# Patient Record
Sex: FEMALE | Race: WHITE
[De-identification: names, ages, dates, MRNs, and addresses within clinical notes are randomized per-mention and may not be internally consistent; named-entity substitution may affect disease eponyms.]

---

## 2018-08-09 ENCOUNTER — HOSPITAL ENCOUNTER (EMERGENCY)
Dept: HOSPITAL 25 - ED | Age: 38
Discharge: HOME | End: 2018-08-09
Payer: SELF-PAY

## 2018-08-09 VITALS — DIASTOLIC BLOOD PRESSURE: 60 MMHG | SYSTOLIC BLOOD PRESSURE: 101 MMHG

## 2018-08-09 DIAGNOSIS — Z98.84: ICD-10-CM

## 2018-08-09 DIAGNOSIS — R00.1: ICD-10-CM

## 2018-08-09 DIAGNOSIS — R11.2: ICD-10-CM

## 2018-08-09 DIAGNOSIS — R10.31: Primary | ICD-10-CM

## 2018-08-09 DIAGNOSIS — Z80.0: ICD-10-CM

## 2018-08-09 LAB
BASOPHILS # BLD AUTO: 0 10^3/UL (ref 0–0.2)
EOSINOPHIL # BLD AUTO: 0.2 10^3/UL (ref 0–0.6)
HCT VFR BLD AUTO: 27 % (ref 35–47)
HGB BLD-MCNC: 8.3 G/DL (ref 12–16)
LYMPHOCYTES # BLD AUTO: 1.9 10^3/UL (ref 1–4.8)
MCH RBC QN AUTO: 19 PG (ref 27–31)
MCHC RBC AUTO-ENTMCNC: 31 G/DL (ref 31–36)
MCV RBC AUTO: 61 FL (ref 80–97)
MONOCYTES # BLD AUTO: 0.5 10^3/UL (ref 0–0.8)
NEUTROPHILS # BLD AUTO: 2.8 10^3/UL (ref 1.5–7.7)
NRBC # BLD AUTO: 0 10^3/UL
NRBC BLD QL AUTO: 0.1
PLATELET # BLD AUTO: 408 10^3/UL (ref 150–450)
RBC # BLD AUTO: 4.37 10^6/UL (ref 4–5.4)
RBC UR QL AUTO: (no result)
WBC # BLD AUTO: 5.5 10^3/UL (ref 3.5–10.8)
WBC UR QL AUTO: (no result)

## 2018-08-09 PROCEDURE — 83690 ASSAY OF LIPASE: CPT

## 2018-08-09 PROCEDURE — 86140 C-REACTIVE PROTEIN: CPT

## 2018-08-09 PROCEDURE — 80053 COMPREHEN METABOLIC PANEL: CPT

## 2018-08-09 PROCEDURE — 93005 ELECTROCARDIOGRAM TRACING: CPT

## 2018-08-09 PROCEDURE — 84484 ASSAY OF TROPONIN QUANT: CPT

## 2018-08-09 PROCEDURE — 81015 MICROSCOPIC EXAM OF URINE: CPT

## 2018-08-09 PROCEDURE — 82550 ASSAY OF CK (CPK): CPT

## 2018-08-09 PROCEDURE — 85060 BLOOD SMEAR INTERPRETATION: CPT

## 2018-08-09 PROCEDURE — 99284 EMERGENCY DEPT VISIT MOD MDM: CPT

## 2018-08-09 PROCEDURE — 81003 URINALYSIS AUTO W/O SCOPE: CPT

## 2018-08-09 PROCEDURE — 96376 TX/PRO/DX INJ SAME DRUG ADON: CPT

## 2018-08-09 PROCEDURE — 85025 COMPLETE CBC W/AUTO DIFF WBC: CPT

## 2018-08-09 PROCEDURE — 87086 URINE CULTURE/COLONY COUNT: CPT

## 2018-08-09 PROCEDURE — 74177 CT ABD & PELVIS W/CONTRAST: CPT

## 2018-08-09 PROCEDURE — 83735 ASSAY OF MAGNESIUM: CPT

## 2018-08-09 PROCEDURE — 96374 THER/PROPH/DIAG INJ IV PUSH: CPT

## 2018-08-09 PROCEDURE — 36415 COLL VENOUS BLD VENIPUNCTURE: CPT

## 2018-08-09 PROCEDURE — 96375 TX/PRO/DX INJ NEW DRUG ADDON: CPT

## 2018-08-09 PROCEDURE — 83605 ASSAY OF LACTIC ACID: CPT

## 2018-08-09 NOTE — ED
Abdominal Pain/Female





- HPI Summary


HPI Summary: 


This is scribe Demarcus Bond documenting for attending Dr. Luis AVILEZ


This patient is a 38 year old F presenting to [facility] with a chief complaint of 8/

10 abd pain since 8/3/18 (but worse since last 2 days). She endorses N/V, 

recent travel and stay in [place], chills, and LKMP starting 8/2/18. Pt denies 

fever, PMHx, Rx, smoking, and substance use. SHx gastric band surgery in 2010.


I, Dr. Smith personally performed the services described in this documentation 

as scribed in my presence and it is both accurate and complete.





- History of Current Complaint


Chief Complaint: EDNauseaVomitDiarrh


Stated Complaint: N/V


Time Seen by Provider: 08/09/18 08:30


Hx Obtained From: Patient


Hx Last Menstrual Period: 8/2/18


Onset/Duration: Gradual Onset, Lasting Weeks, Still Present, Worse Since - 2 

days ago (8/7/18)


Timing: Constant


Severity Initially: Mild


Severity Currently: Severe


Pain Intensity: 8


Pain Scale Used: 0-10 Numeric


Location: Discrete At: RLQ


Radiates: No


Character: Sharp


Aggravating Factor(s): Nothing


Alleviating Factor(s): Nothing


Associated Signs and Symptoms: Positive: Nausea, Vomiting, Other: - chills.  

Negative: Fever


Allergies/Adverse Reactions: 


 Allergies











Allergy/AdvReac Type Severity Reaction Status Date / Time


 


No Known Allergies Allergy   Verified 08/09/18 08:23














PMH/Surg Hx/FS Hx/Imm Hx


Endocrine/Hematology History: 


   Denies: Hx Sickle Cell Disease


Cardiovascular History: 


   Denies: Hx Myocardial Infarction


Respiratory History: 


   Denies: Hx Lung Cancer


 History: 


   Denies: Hx Dialysis


Musculoskeletal History: 


   Denies: Hx Rheumatoid Arthritis


Sensory History: 


   Denies: Hx Legally Blind, Hx Deafness


Opthamlomology History: 


   Denies: Hx Legally Blind


EENT History: 


   Denies: Hx Deafness


Neurological History: 


   Denies: Hx CVA


Psychiatric History: 


   Denies: Hx Schizophrenia





- Surgical History


Surgery Procedure, Year, and Place: Gastric band 2010


Infectious Disease History: No


Infectious Disease History: 


   Denies: Traveled Outside the [place] in Last 30 Days





- Family History


Known Family History: Positive: Other - gall bladder disease, cancer





- Social History


Occupation: Employed Full-time


Alcohol Use: Occasionally


Hx Substance Use: No


Substance Use Type: Reports: None


Smoking Status (MU): Never Smoked Tobacco





Review of Systems


Positive: Chills.  Negative: Fever


Positive: Abdominal Pain, Vomiting, Nausea


Positive: no symptoms reported


All Other Systems Reviewed And Are Negative: Yes





Physical Exam





- Summary


Physical Exam Summary: 


VITAL SIGNS: Reviewed.


GENERAL: Patient is an obese female who is lying comfortable in the stretcher. 

Patient is not in any acute respiratory distress.


HEAD AND FACE: No signs of trauma. No ecchymosis, hematomas or skull 

depressions. No sinus tenderness.


EYES: PERRLA, EOMI x 2, No injected conjunctiva, no nystagmus.


EARS: Hearing grossly intact. Ear canals and tympanic membranes are within 

normal limits.


MOUTH: Oropharynx within normal limits. Dry oral mucosa


NECK: Supple, trachea is midline, no adenopathy, no JVD, no carotid bruit, no c-

spine tenderness, neck with full ROM.


CHEST: Symmetric, no tenderness at palpation


LUNGS: Clear to auscultation bilaterally. No wheezing or crackles.


CVS: Regular rate and rhythm, S1 and S2 present, no murmurs or gallops 

appreciated.


ABDOMEN: Soft, RLQ tenderness. No signs of distention. No rebound no guarding, 

and no masses palpated. Bowel sounds are normal.


EXTREMITIES: FROM in all major joints, no edema, no cyanosis or clubbing.


NEURO: Alert and oriented x 3. No acute neurological deficits. Speech is normal 

and follows commands.


SKIN: Dry and warm, increased turgor


Triage Information Reviewed: Yes


Vital Signs On Initial Exam: 


 Initial Vitals











Temp Pulse Resp BP Pulse Ox


 


 97.7 F   84   18   132/85   100 


 


 08/09/18 08:17  08/09/18 08:17  08/09/18 08:17  08/09/18 08:17  08/09/18 08:17











Vital Signs Reviewed: Yes





Diagnostics





- Vital Signs


 Vital Signs











  Temp Pulse Resp BP Pulse Ox


 


 08/09/18 08:52    16  


 


 08/09/18 08:17  97.7 F  84  18  132/85  100














- Laboratory


Result Diagrams: 


 08/09/18 08:46





 08/09/18 08:46


Lab Statement: Any lab studies that have been ordered have been reviewed, and 

results considered in the medical decision making process.





- CT


  ** A/P


CT Interpretation: No Acute Changes


CT Interpretation Completed By: Radiologist - NO ACUTE CT FINDINGS. NO MASS OR 

INFLAMMATORY CHANGES.  GASTRIC LAP BANDING. Dr. Smith has reviewed this report.





- EKG


  ** 0847


Cardiac Rate: Bradycardia - 57


ST Segment: Normal


Ectopy: None


EKG Interpretation: No STEMI, nl axis





Abdominal Pain Fem Course/Dx





- Course


Course Of Treatment: Dr. Marrero released some pressure from lap band, thinks pt 

needs endoscopy, but since patient lives in [place], pt prefers to do the 

workup there.  This patient is a 38-year-old female who presents to the 

emergency department with a chief complaint of having abdominal pain along with 

nausea and vomiting.  Patient reports that she is unable to keep anything PO.  

And blood test results without any significant abnormality except for 

hemoglobin 8.3 hematocrit 27 and MCV 61 consistent with a hypochromic 

microcytic anemia.  The potassium level is 3.0 and magnesium 1.7.  Patient was 

given potassium chloride and magnesium by mouth.  At this time the patient is 

able to tolerate by mouth.  The pain has subsided.  The patient was given IV 

fluids, Zofran and Reglan for the nausea and vomiting.  She was given morphine 

for the pain.  Abdominopelvic CT impression: No acute intra-abdominal 

pathology.  Positive gastric  lap banding.  Patient reports the nausea hasnt 

significantly improved.  I discussed the case with Dr. Marrero from surgery and 

he came and examined the patient.  He reports that he decreased the tension on 

the gastric lap band and the patient is feeling better.  He recommends to get a 

endoscopy however the patient is from [place] and she wants to go back 

tomorrow.  Therefore she reports that she will follow-up with her surgeon for 

follow-up visit and possibly an endoscopy.  Patient will be given a 

prescription for Reglan and the follow-up with her surgeon.  Patient is 

hemodynamically stable alert and 3.





- Diagnoses


Provider Diagnoses: 


 Nausea & vomiting, Abdominal pain








- Provider Notifications


Discussed Care Of Patient With: Sameer Marrero


Time Discussed With Above Provider: 14:15


Instructed by Provider To: Other - Will see pt in ED. update 1515. Dr marrero 

released some pressure from lap band, thinks pt needs endoscopy, but since 

patient lives in [place], pt prefers to do the workup there.





Discharge





- Sign-Out/Discharge


Documenting (check all that apply): Patient Departure - discharge





- Discharge Plan


Condition: Stable


Disposition: HOME


Prescriptions: 


Metoclopramide TAB* [Reglan TAB*] 10 mg PO Q8H #15 tab


Patient Education Materials:  Acute Nausea and Vomiting (ED), Abdominal Pain (ED

)


Referrals: 


[facility] [Provider Group] - 3 Days


Additional Instructions: 


Return to the emergency department for any new or worsening symptoms.





Attestations


User Type: Provider - I, Dr. Smith personally performed the services described 

in this documentation as scribed in my presence and it is both accurate and 

complete.

## 2018-08-09 NOTE — RAD
INDICATION: Abdominal pain. History of bariatric surgery 2010



COMPARISON: None

 

TECHNIQUE: Axial source images were obtained from the hemidiaphragms to the symphysis

pubis following administration of oral and intravenous contrast.  107 mL Omnipaque 300 was

utilized. Coronal and sagittal reconstructed images were acquired.



Lung bases: The lung bases are clear.



Liver: The liver is normal in size. There are no masses. There is no ductal dilatation.



Gallbladder: There are no calcified gallstones. There is no evidence of wall thickening or

pericholecystic fluid.



Spleen: The spleen is normal in size. There are no masses.



Pancreas: There is no focal pancreatic mass or ductal dilatation.



Adrenal glands: There is no evidence of adrenal mass.



Kidneys: The kidneys are normal in size and position. There are prompt nephrograms and

there is prompt excretion bilaterally. There are no renal parenchymal masses. There is no

evidence of nephrolithiasis.



Adenopathy: There is no evidence of adenopathy by size criteria.



Fluid collections: There are no free or localized fluid collections.



Vessels:There are no significant atherosclerotic changes involving the aorta. There is no

focal aneurysm. The iliac vessels are normal in caliber. The IVC appears normal.



GI tract: There are no acute CT bowel findings. There is gastric lap banding There is no

obstruction. The stomach and small bowel appear normal. The lower GI tract is normal.  The

cecum, ileocecal valve, and terminal ileum appear normal. The appendix not visualized.

There is no inflammatory change in the periappendiceal region.



Pelvic organs: The uterus and left adnexa are normal. There are prominent vessels in the

right adnexal region. This is of indeterminate significance but is believed to represent

an incidental finding. The right adnexa is otherwise unremarkable.



Bladder: There are no bladder masses.



Abdominal and pelvic soft tissues: The extraperitoneal abdominal and pelvic soft tissues

appear normal..



Osseous structures: There are no acute osseous findings.



Other: None



IMPRESSION:  NO ACUTE CT FINDINGS. NO MASS OR INFLAMMATORY CHANGES.  GASTRIC LAP BANDING.

## 2018-08-09 NOTE — CONS
CC:  Dr. Green.*

 

SURGICAL CONSULTATION NOTE:

 

DATE OF CONSULT:  08/09/18

 

LOCATION:  This patient was seen in the [facility] Emergency 
Department on Thursday, 07/09/18.

 

ATTENDING PHYSICIAN:  Dr. Sameer Green.

 

CHIEF COMPLAINT:  Nausea and abdominal pain.

 

HISTORY OF PRESENT ILLNESS:  The patient is a 38-year-old female from [place], 
who is visiting family here in [place]; she came to the emergency room with 
persistent nausea, vomiting and abdominal pain.  She had a lap gastric band 9 
years ago in [place]; she states that 2 years ago when she was pregnant, she 
had nausea and the Lap-Band was emptied and has not been refilled since.  For 
the past 2 years, she has had intermittent nausea and vomiting.  She has not 
had followup because her bariatric surgeon passed away and it has been 
difficult for her to reestablish bariatric care.  She does have a primary care 
provider in [place].  Six months ago, she had severe abdominal pain and had a 
workup for gallbladder disease and she reports that the ultrasound and CAT scan 
were normal.  She was treated for what was thought to be C. diff.  She reports 
since she arrived in [place] 1 week ago, she has been persistently nauseated and 
then on Monday of this week, she started vomiting and yesterday could not even 
tolerate ice chips.  She reported her abdominal pain mostly in the 
periumbilical region as 8/10 and occurred with vomiting or dry heaving.  Her 
last bowel movement was on Friday, 08/03/18 and was described as diarrhea.  She 
denies any blood or mucus in the stool.  She states that in the past, she did 
have some blood in her vomitus and had a blood transfusion in 2010. She reports 
acid reflux and takes Tums with some relief and has been trying to manage her 
diet to control those symptoms.  She denies any fever or chills or dysuria.  
Her last menstrual period started, 08/02/18.  In the emergency room, her white 
blood cell count was 5.5.  She was noted to have microcytic anemia with a 
hemoglobin of 8.3, hematocrit of 27; her potassium was low at 3 and magnesium 
was low at 1.7.  Dr. Green and Dr. Garcia reviewed the CAT scan of the abdomen
, which did not reveal any acute findings or any obstruction or no inflammatory 
changes around the appendix; Dr. Garcia commented that she might have a small 
hiatal hernia, but he did not note any Lap-Band slippage.  Dr. Garcia 
recommended intravenous hydration in the emergency room and a trial of clear 
liquids and then outpatient followup with upper endoscopy.  The patient is in 
agreement with the plan.  Dr. Green also accessed the Lap-Band port and 
removed approximately 1 mL of fluid.

 

PAST MEDICAL HISTORY:  Generally healthy other than previously mentioned acid 
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 
without surgery.  She states that she has had anemia and hypokalemia for the 
past 9 years since the Lap-Band.

 

PAST SURGICAL HISTORY:  Lap gastric banding 9 years ago in [place].

 

MEDICATIONS:  Multivitamin and occasionally iron when she remembers.

 

ALLERGIES:  No known drug allergies.

 

FAMILY HISTORY:  Parents are alive and well.  Her father is diabetic.  No known 
gastrointestinal conditions.

 

SOCIAL HISTORY:  She is here visiting her boyfriend who works in [place]; she 
has 3 children ages 13, 4 and 2; she has never been a smoker.  She rarely 
drinks alcohol and denies the use of substances and is employed as a .

 

REVIEW OF SYSTEMS:  Constitutional:  No fevers or chills.  She does feel tired 
most of the time.  She has had good weight loss from the gastric banding.  She 
was 330 pounds before the band and currently weighs 170 pounds and her weight 
has been essentially stable over the past year.  Endocrine:  No diabetes or 
thyroid disease. Cardiovascular:  No chest pain or palpitations.  Respiratory:  
No dyspnea on exertion.  No chronic cough.  Gastrointestinal:  As described in 
history of present illness.  Genitourinary:  No dysuria.  No history of kidney 
stones. Musculoskeletal:  No complaints.  Neurologic:  No history of seizures 
or concussions.  General:  No previous anesthesia complications.  No history of 
deep vein thrombosis or pulmonary embolism.

 

PHYSICAL EXAM:  General Survey:  The patient is a 38-year-old female, well- 
developed, well-nourished, in no acute distress.  Height 67 inches, weight 177 
pounds, body mass index 27.7.  Blood pressure 132/85, pulse 84 and regular, 
respiratory rate 18, temperature 97.7 tympanic, O2 saturation on room air 100%. 
Skin:  Warm, dry, intact.  HEENT:  Benign.  Neck:  Supple.  No cervical 
lymphadenopathy.  Lungs:  Breath sounds bilaterally clear and equal.  Heart: 
Regular rate and rhythm.  No murmurs or rubs appreciated.  Abdomen:  Hypoactive 
bowel sounds.  Soft, nondistended.  No guarding.  No obvious masses.  She 
reports "soreness," but is nontender to deep palpation.  There is a palpable Lap
-Band port in the epigastric region.  Pelvic and rectal exams deferred.  
Extremities are warm without edema or skin ulceration.  Neurologic:  Alert and 
oriented x3.  Steady gait.

 

IMPRESSION:  Dehydration due to vomiting.

 

PLAN:  As discussed with both Dr. Green and Dr. Garcia, the patient will be 
rehydrated with IV fluids in the emergency department.  She will have a trial 
of clear liquids and if she tolerates the clear liquids, she will be discharged 
home and she will have a followup in [place] and she plans to go home tomorrow.
  We recommended followup with an upper endoscopy to rule out any erosive 
problems such as an ulcer and she will also obtain old records from her 
previous bariatric surgeon.

 

TIME SPENT:  60 minutes with greater than 50% in face-to-face history taking 
and patient counseling.

 

____________________________________ BILL CALHOUN NP

 

[id number]/CPS #: [id number]

DAYSI

## 2018-08-10 ENCOUNTER — HOSPITAL ENCOUNTER (INPATIENT)
Dept: HOSPITAL 25 - ED | Age: 38
LOS: 4 days | Discharge: HOME | DRG: 989 | End: 2018-08-14
Attending: SURGERY | Admitting: HOSPITALIST
Payer: SELF-PAY

## 2018-08-10 DIAGNOSIS — T85.528A: Primary | ICD-10-CM

## 2018-08-10 DIAGNOSIS — K29.70: ICD-10-CM

## 2018-08-10 DIAGNOSIS — K21.9: ICD-10-CM

## 2018-08-10 DIAGNOSIS — Z83.3: ICD-10-CM

## 2018-08-10 DIAGNOSIS — K20.8: ICD-10-CM

## 2018-08-10 DIAGNOSIS — E86.0: ICD-10-CM
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
## 2018-08-10 DIAGNOSIS — R13.10: ICD-10-CM

## 2018-08-10 DIAGNOSIS — E87.6: ICD-10-CM

## 2018-08-10 DIAGNOSIS — E83.42: ICD-10-CM

## 2018-08-10 DIAGNOSIS — E86.1: ICD-10-CM

## 2018-08-10 DIAGNOSIS — K44.9: ICD-10-CM

## 2018-08-10 DIAGNOSIS — Z98.84: ICD-10-CM

## 2018-08-10 DIAGNOSIS — R11.2: ICD-10-CM

## 2018-08-10 DIAGNOSIS — D50.9: ICD-10-CM

## 2018-08-10 LAB
BASOPHILS # BLD AUTO: 0 10^3/UL (ref 0–0.2)
EOSINOPHIL # BLD AUTO: 0.2 10^3/UL (ref 0–0.6)
HCT VFR BLD AUTO: 24 % (ref 35–47)
HGB BLD-MCNC: 7.4 G/DL (ref 12–16)
LYMPHOCYTES # BLD AUTO: 1.4 10^3/UL (ref 1–4.8)
MCH RBC QN AUTO: 19 PG (ref 27–31)
MCHC RBC AUTO-ENTMCNC: 31 G/DL (ref 31–36)
MCV RBC AUTO: 62 FL (ref 80–97)
MONOCYTES # BLD AUTO: 0.4 10^3/UL (ref 0–0.8)
NEUTROPHILS # BLD AUTO: 2.1 10^3/UL (ref 1.5–7.7)
NRBC # BLD AUTO: 0 10^3/UL
NRBC BLD QL AUTO: 0
PLATELET # BLD AUTO: 341 10^3/UL (ref 150–450)
RBC # BLD AUTO: 3.92 10^6/UL (ref 4–5.4)
WBC # BLD AUTO: 4.1 10^3/UL (ref 3.5–10.8)

## 2018-08-10 PROCEDURE — 80048 BASIC METABOLIC PNL TOTAL CA: CPT

## 2018-08-10 PROCEDURE — 86900 BLOOD TYPING SEROLOGIC ABO: CPT

## 2018-08-10 PROCEDURE — 84100 ASSAY OF PHOSPHORUS: CPT

## 2018-08-10 PROCEDURE — 86850 RBC ANTIBODY SCREEN: CPT

## 2018-08-10 PROCEDURE — 83540 ASSAY OF IRON: CPT

## 2018-08-10 PROCEDURE — 36415 COLL VENOUS BLD VENIPUNCTURE: CPT

## 2018-08-10 PROCEDURE — 0DB58ZX EXCISION OF ESOPHAGUS, VIA NATURAL OR ARTIFICIAL OPENING ENDOSCOPIC, DIAGNOSTIC: ICD-10-PCS | Performed by: INTERNAL MEDICINE

## 2018-08-10 PROCEDURE — 83550 IRON BINDING TEST: CPT

## 2018-08-10 PROCEDURE — 82728 ASSAY OF FERRITIN: CPT

## 2018-08-10 PROCEDURE — 86901 BLOOD TYPING SEROLOGIC RH(D): CPT

## 2018-08-10 PROCEDURE — 83690 ASSAY OF LIPASE: CPT

## 2018-08-10 PROCEDURE — 88300 SURGICAL PATH GROSS: CPT

## 2018-08-10 PROCEDURE — 86922 COMPATIBILITY TEST ANTIGLOB: CPT

## 2018-08-10 PROCEDURE — 83735 ASSAY OF MAGNESIUM: CPT

## 2018-08-10 PROCEDURE — 88312 SPECIAL STAINS GROUP 1: CPT

## 2018-08-10 PROCEDURE — 85045 AUTOMATED RETICULOCYTE COUNT: CPT

## 2018-08-10 PROCEDURE — 99156 MOD SED OTH PHYS/QHP 5/>YRS: CPT

## 2018-08-10 PROCEDURE — 87077 CULTURE AEROBIC IDENTIFY: CPT

## 2018-08-10 PROCEDURE — 85018 HEMOGLOBIN: CPT

## 2018-08-10 PROCEDURE — 85014 HEMATOCRIT: CPT

## 2018-08-10 PROCEDURE — 0DB68ZX EXCISION OF STOMACH, VIA NATURAL OR ARTIFICIAL OPENING ENDOSCOPIC, DIAGNOSTIC: ICD-10-PCS | Performed by: INTERNAL MEDICINE

## 2018-08-10 PROCEDURE — G0378 HOSPITAL OBSERVATION PER HR: HCPCS

## 2018-08-10 PROCEDURE — 88305 TISSUE EXAM BY PATHOLOGIST: CPT

## 2018-08-10 PROCEDURE — 83605 ASSAY OF LACTIC ACID: CPT

## 2018-08-10 PROCEDURE — 84702 CHORIONIC GONADOTROPIN TEST: CPT

## 2018-08-10 PROCEDURE — 86140 C-REACTIVE PROTEIN: CPT

## 2018-08-10 PROCEDURE — 85025 COMPLETE CBC W/AUTO DIFF WBC: CPT

## 2018-08-10 PROCEDURE — 43774 LAP RMVL GASTR ADJ ALL PARTS: CPT

## 2018-08-10 PROCEDURE — 80053 COMPREHEN METABOLIC PANEL: CPT

## 2018-08-10 PROCEDURE — 99284 EMERGENCY DEPT VISIT MOD MDM: CPT

## 2018-08-10 PROCEDURE — 85610 PROTHROMBIN TIME: CPT

## 2018-08-10 PROCEDURE — 99157 MOD SED OTHER PHYS/QHP EA: CPT

## 2018-08-10 PROCEDURE — P9040 RBC LEUKOREDUCED IRRADIATED: HCPCS

## 2018-08-10 RX ADMIN — PROCHLORPERAZINE EDISYLATE PRN MEQ: 5 INJECTION INTRAMUSCULAR; INTRAVENOUS at 15:47

## 2018-08-10 RX ADMIN — PANTOPRAZOLE SODIUM SCH MG: 40 INJECTION, POWDER, FOR SOLUTION INTRAVENOUS at 21:04

## 2018-08-10 RX ADMIN — POTASSIUM CHLORIDE SCH MLS/HR: 200 INJECTION, SOLUTION INTRAVENOUS at 19:47

## 2018-08-10 RX ADMIN — POTASSIUM CHLORIDE SCH MLS/HR: 200 INJECTION, SOLUTION INTRAVENOUS at 15:51

## 2018-08-10 RX ADMIN — POTASSIUM CHLORIDE SCH MLS/HR: 200 INJECTION, SOLUTION INTRAVENOUS at 17:50

## 2018-08-10 RX ADMIN — SODIUM CHLORIDE SCH MLS/HR: 900 IRRIGANT IRRIGATION at 15:48

## 2018-08-10 NOTE — HP
CC:  Dr. eDras; Dr. Chen; [place] PRINCESS Dobbins, phone number [phone]*

 

HISTORY AND PHYSICAL:

 

DATE OF ADMISSION:  08/10/18

 

PRIMARY CARE PROVIDER:  Virginia Dobbins.  Phone number [phone].

 

MY ATTENDING PHYSICIAN WHILE IN THE HOSPITAL:  Dr. Jane Valente* (report 
dictated by Ezio Davison NP).

 

CONSULTING GASTROENTEROLOGIST:  Dr. Chen.

 

CONSULTING SURGEON:  Dr. Deras.

 

CHIEF COMPLAINT:

1.  Nausea.

2.  Abdominal pain.

3.  Diarrhea.

 

HISTORY OF PRESENT ILLNESS:  Mrs. Klein is a 38-year-old female patient who 9 
years ago had a lap band procedure done.  She said she had 1 episode of 
diarrhea on Friday and then subsequently since throughout the weekend she 
really has not been able to take good p.o. intake.  She has been able to keep 
fluids down throughout the weekend, but she has just been nauseous having 
abdominal cramping, lower pain. She has been having issues with nausea and 
vomiting throughout the weekend.  She was able to keep food down.  She felt 
okay on Tuesday.  She thought that she would try some soup, add chillies.  She 
tried the soup there and then just vomited up and ever since then she has been 
unable to keep anything down.  She tried sitting in a warm hot tub to see if 
that would help her.  She said the warm did help, but as soon as she got out, 
she felt worse.  She denied any marijuana usage.  She says that the pain had 
been unrelenting.  She had just the nausea and vomiting.  No more diarrhea.  
She said that she has not had a bowel movement since Friday.  She said that she 
has not had any fevers or chills.  She came into the ED yesterday, was 
evaluated.  She has the lap band from 9 years ago.  The fluid was removed by 
our surgical team, but despite all of this she still continued to have 
significant symptoms.  The patient was evaluated again in the ED, because she 
just was not getting any better.  We were asked to evaluate for admission due 
to the intractable nausea and vomiting and she denied having any chest pain or 
shortness of breath.

 

PAST MEDICAL HISTORY:  Significant for:

 

1.  GERD.

2.  Anemia.

3.  Hypokalemia.

 

PAST SURGICAL HISTORY:  She has had a lap band.

 

HOME MEDICATIONS:  Include;

 

1.  A multivitamin once a day.

2.  She was just prescribed Reglan 10 mg p.o. every 8 hours yesterday.

 

ALLERGIES TO MEDICATIONS:  Include no known drug allergies.

 

FAMILY HISTORY:  She says her mother is healthy.  Father had a history of 
diabetes.

 

SOCIAL HISTORY:  She does not smoke, rarely drinks alcohol.  She does not have 
a surrogate decision maker and she has 3 children.  She works as a .  
She is from [place] visiting her boyfriend.

 

REVIEW OF SYSTEMS:  There is no documented fever.  Denies having any 
significant weight change.  There was no double vision.  There is no ear 
discharge.  Denied having any rhinorrhea.  There was no sore throat.  There was 
no thyroid enlargement.  She denied any chest pain.  No orthopnea.  No 
nocturnal dyspnea. There is abdominal pain per my HPI.  There is nausea and 
vomiting.  There was one episode of diarrhea.  There is no dysuria, no frequency
, no seizure, no loss of consciousness, no pruritus, no skin ulceration.  
Review of 14 systems completed, all others negative.

 

                               PHYSICAL EXAMINATION

 

GENERAL:  At this time, Mrs. Klein is a 38-year-old female patient.  She is 
sitting in the ED stretcher.  She does not appear to be in any acute distress.

 

VITAL SIGNS:  Blood pressure 133/90, pulse 51, respirations were 18, O2 sat 100
% on room air, and temperature 98.9.

 

HEENT:  Head, atraumatic and normocephalic.  Eyes, EOMs are intact.  Sclerae 
anicteric and not pale.

 

NECK:  Supple.  Throat; oral mucosa appears to be moist.  No oropharyngeal 
erythema.

 

LUNGS:  Clear to auscultation.  No wheezes, rales, or rhonchi.

 

HEART:  Sounds S1 and S2.  Regular rate and rhythm.  No murmurs, rubs, or 
gallops.

 

ABDOMEN:  Soft, flat, bowel sounds were present.  There was tenderness in the 
left lower and right lower quadrant.

 

EXTREMITIES:  Pulses were 2+ throughout.  She had no peripheral edema.  She is 
moving all 4 extremities with 5/5 strength.

 

NEUROLOGIC:  The patient is awake, alert.  She is oriented x3.  No gross focal 
deficits.

 

SKIN:  Intact.

 

 LABORATORY DATA:  Today WBC of 4.1, RBC of 3.92, hemoglobin of 7.4, hematocrit 
of 24, platelet count of 341.  The sodium was 140, potassium was 3, chloride of 
106, bicarb 28, BUN 10, creatinine 0.52, glucose 105, lactic 0.9, calcium 8.8, 
mag 1.8, total bili 1.1, AST 6, ALT 8, alk phos 30, CRP less than 1.  Albumin 
3.6, lipase normal, beta hCG is negative.  She did have a urine done yesterday 
which showed low specific gravity, 1+ blood, trace leukocyte esterase, present 
squamous epithelial cells, present hyaline casts.

 

She did have an abdominal pelvis CT scan just done yesterday as well which 
showed: Impression:  No acute CT findings.  No mass or inflammatory changes.  
She did have an EKG done yesterday, showed sinus bradycardia with a rate of 57.
  No ST elevations or T-wave inversions.  Old medial records were reviewed.

 

ASSESSMENT AND PLAN:  Mrs. Klein is a 38-year-old female patient coming into the 
ED today with complaints of a week's worth of nausea, vomiting.  She had one 
episode of diarrhea, but the nausea, vomiting, and abdominal pain has been 
getting progressively worse particularly over the last 72 hours.  We were asked 
to evaluate for admission.  She will be admitted under observation status for:

 

1.  Abdominal discomfort with nausea and vomiting.  Etiology is unclear.  
Question possible gastritis either related from mechanical issue due to the lap 
band being too tight or possibly the patient having an underlying infection, 
although this seems unlikely without having the diarrhea.  My plan will be 
continue with supportive care, p.r.n. pain medications, Zofran, Compazine had 
been ordered, IV fluids replaced with electrolytes.  I have consulted GI and 
Surgery, both of which will be seeing the patient.  The plan is to undergo EGD 
later today to evaluate for any ulcers.  I have placed her empirically on a PPI 
therapy.

2.  Gastroesophageal reflux disease.  Continue PPI therapy.

3.  Hypokalemia.  I am actively replacing this.

4.  Hypomagnesemia.  I am replacing this as well.

5.  DVT prophylaxis.  I have placed her on SCDs.

6.  Code status is full code.

7.  Fluids, electrolytes, and nutrition.  She can have a normal saline at 125 
cc an hour after scoping, we can consider trying a clear liquid diet.

 

TIME SPENT:  Time spent on the admission 60 minutes, greater than half the time 
spent face-to-face with the patient obtaining my history and physical.  The 
other half the time spent going over the plan of care with the patient, 
implementing the plan of care.

 

I discussed the plan of care with my attending Dr. Valente.  She is in agreement.

 

 ____________________________________ 

EZIO DAVISON, NENO

 

[id number]/CPS #: [id number]

DAYSI

## 2018-08-10 NOTE — ED
Abdominal Pain/Female





- HPI Summary


HPI Summary: 


This is scribe Demarcus Bond documenting for attending Dr. Luis AVILEZ


This patient is a 38 year old F presenting to [facility] with a chief complaint of 

increasing abd pain for 4 days (since 8/6/18). Pt was in the ED for similar sx 

yesterday, 8/9/18, and was discharged after having tension released on her 

gastric band. Pt endorses N/V. Pt took Reglan around 2000 and her N/V resolved 

and she was OK until the meds wore off by 0000, and her sx worsened. She took 

another dose of Reglan, slept for 1 hour, and then and was awake for rest of 

night. Pt denies BM, ability to keep PO down. 


I, Dr. Smith personally performed the services described in this documentation 

as scribed in my presence and it is both accurate and complete.





- History of Current Complaint


Chief Complaint: EDAbdPain


Stated Complaint: ABD PAIN


Time Seen by Provider: 08/10/18 06:51


Hx Obtained From: Patient


Hx Last Menstrual Period: 8/2/18


Onset/Duration: Sudden Onset, Lasting Days, Still Present


Timing: Constant


Severity Initially: Moderate


Severity Currently: Severe


Pain Intensity: 10


Pain Scale Used: 0-10 Numeric


Location: Epigastric


Radiates: No


Aggravating Factor(s): Nothing


Alleviating Factor(s): Medications - Reglan


Associated Signs and Symptoms: Positive: Nausea, Vomiting.  Negative: Fever, 

Blood in Stool, Diarrhea


Allergies/Adverse Reactions: 


 Allergies











Allergy/AdvReac Type Severity Reaction Status Date / Time


 


No Known Allergies Allergy   Verified 08/09/18 08:23











Home Medications: 


 Home Medications





Multivitamins/Minerals TAB* [Theragran/minerals TAB*] 1 tab PO DAILY 08/10/18 [

History Confirmed 08/10/18]











PMH/Surg Hx/FS Hx/Imm Hx


Endocrine/Hematology History: 


   Denies: Hx Sickle Cell Disease


Cardiovascular History: 


   Denies: Hx Myocardial Infarction


Respiratory History: 


   Denies: Hx Lung Cancer


GI History: Reports: Hx Ulcer - gastric


 History: 


   Denies: Hx Dialysis


Musculoskeletal History: 


   Denies: Hx Rheumatoid Arthritis


Sensory History: 


   Denies: Hx Legally Blind, Hx Deafness, Hx Hearing Aid


Opthamlomology History: 


   Denies: Hx Legally Blind


EENT History: 


   Denies: Hx Deafness, Hx Hearing Aid


Neurological History: 


   Denies: Hx CVA


Psychiatric History: 


   Denies: Hx Autism, Hx Schizophrenia





- Surgical History


Surgery Procedure, Year, and Place: Gastric band 2010


Infectious Disease History: No


Infectious Disease History: 


   Denies: Traveled Outside the [place] in Last 30 Days





- Family History


Known Family History: Positive: Other - gall bladder disease, cancer





- Social History


Alcohol Use: Occasionally


Hx Substance Use: No


Substance Use Type: Reports: None


Smoking Status (MU): Never Smoked Tobacco





Review of Systems


Negative: Fever


Positive: Abdominal Pain, Vomiting, Nausea.  Negative: Diarrhea


Positive: no symptoms reported


All Other Systems Reviewed And Are Negative: Yes





Physical Exam





- Summary


Physical Exam Summary: 


VITAL SIGNS: Reviewed.


GENERAL: Patient is a well-developed and nourished female who is lying 

comfortable in the stretcher. Patient is not in any acute respiratory distress.


HEAD AND FACE: No signs of trauma. No ecchymosis, hematomas or skull 

depressions. No sinus tenderness.


EYES: PERRLA, EOMI x 2, No injected conjunctiva, no nystagmus.


EARS: Hearing grossly intact. Ear canals and tympanic membranes are within 

normal limits.


MOUTH: Oropharynx within normal limits. Dry oral mucosa


NECK: Supple, trachea is midline, no adenopathy, no JVD, no carotid bruit, no c-

spine tenderness, neck with full ROM.


CHEST: Symmetric, no tenderness at palpation


LUNGS: Clear to auscultation bilaterally. No wheezing or crackles.


CVS: Regular rate and rhythm, S1 and S2 present, no murmurs or gallops 

appreciated.


ABDOMEN: Soft, tenderness in epigastric area. No signs of distention. No rebound

, no guarding, and no masses palpated. Bowel sounds are normal.


EXTREMITIES: FROM in all major joints, no edema, no cyanosis or clubbing.


NEURO: Alert and oriented x 3. No acute neurological deficits. Speech is normal 

and follows commands.


SKIN: Dry and warm


Triage Information Reviewed: Yes


Vital Signs On Initial Exam: 


 Initial Vitals











Temp Pulse Resp BP Pulse Ox


 


 98.9 F   75   18   134/86   100 


 


 08/10/18 06:30  08/10/18 06:30  08/10/18 06:30  08/10/18 06:30  08/10/18 06:30











Vital Signs Reviewed: Yes





Diagnostics





- Vital Signs


 Vital Signs











  Temp Pulse Resp BP Pulse Ox


 


 08/10/18 06:38   94   136/74  100


 


 08/10/18 06:30  98.9 F  75  18  134/86  100














- Laboratory


Result Diagrams: 


 08/12/18 04:56





 08/12/18 04:56


Lab Statement: Any lab studies that have been ordered have been reviewed, and 

results considered in the medical decision making process.





Abdominal Pain Fem Course/Dx





- Course


Course Of Treatment: This patient is a 38-year-old female who presents to the 

emergency department with a chief complaint of having abdominal pain started 

with nausea and vomiting.  The patient was seen yesterday in the emergency 

department for the symptoms results, she was treated and felt better.  The 

patient was able to tolerate by mouth here today however today the symptoms 

worsen.  Therefore she decided to come to the emergency department for further 

workup and management.  Patient has history of a gastric lap band and yesterday 

Dr. Green remove some fluid from the band and the patient felt better.  

However he recommended to do an anoscopy to rule out any type of ulcers.  

Declined rectal exam. Will send occult blood testing when she has a bowel 

movement.  Test results CBC shows an hemoglobin of 7.40 with hematocrit 24 

which is less than yesterday.  MCV is 62.  Sodium is 140, potassium 30, glucose 

of 105, magnesium 1.8 and urinalysis is negative for UTI.  Abdominopelvic CT 

done yesterday showed no acute intra-abdominal pathology.  Today in the ED 

course and the patient was given IV fluids for rehydration, the patient is 

given potassium and magnesium for the hypokalemia and hypomagnesemia.  The 

patient also was given morphine for the pain and Reglan for the nausea and 

vomiting.  She continues to have nausea vomiting therefore the patient was 

given Zofran IV.  At this time I consulted with Dr. Deras from surgery and 

he recommends for the patient to be admitted to the hospitalist and they will 

consult.  Discussed the case with Dr. Angeles who accepted the patient for 

admission.  Patient continues to be hemodynamically stable alert and oriented 

3.





- Diagnoses


Provider Diagnoses: 


 Intractable vomiting, Upper abdominal pain, Anemia








- Provider Notifications


Discussed Care Of Patient With: Shady Deras


Time Discussed With Above Provider: 11:22


Instructed by Provider To: Other - this pt needs nothing surgical, admission is 

a matter of rehydrating and controlling N/V.





Discharge





- Sign-Out/Discharge


Documenting (check all that apply): Patient Departure





- Discharge Plan


Condition: Stable


Disposition: ADMITTED TO [facility]





- Billing Disposition and Condition


Condition: STABLE


Disposition: Admitted to [facility]





Consult


Consult: 





1206 Dr. Angeles: Accepts admission





Attestation Statement


User Type: Provider - I, Dr. Smith personally performed the services described 

in this documentation as scribed in my presence and it is both accurate and 

complete.

## 2018-08-10 NOTE — CONS
CC:  [facility]

 

CONSULTATION REPORT:

 

DATE OF CONSULT:  08/10/18

 

REFERRING PROVIDER:  Ezio Davison NP, Hospitalist.

 

REASON FOR CONSULT:  Nausea, vomiting, and abdominal pain.

 

HISTORY OF PRESENT ILLNESS:  Ms. Odalis Klein is a 38-year-old woman who is visiting here from [place]
.  She initially was seen in the emergency room in Surgical consultation yesterday, 08/09/18, when sh
collin presented with 4 to 5 days of periumbilical discomfort with profuse nausea and vomiting.

 

She has a history of laparoscopic gastric banding that was placed in [place] 9 years ago.

 

Yesterday, she underwent a CAT scan, which showed a hiatal hernia with mildly dilated distal esophagu
s, but the band appeared to be patent and no other acute abnormalities were noted.  At that time, the
 band was completely deflated and saline was removed.  She was discharged home last night after she t
olerated liquids in the emergency room, however last night she developed profuse nausea and vomiting 
through the night and abdominal pain, presented back to the emergency room today.

 

Today, she is being admitted to the hospitalist service for IV hydration and management.  Gastroenter
ology and Surgical consultations have been obtained.

 

She has had no change in bowel habits, as a matter of fact she has been constipated.  She has had no 
fevers, shakes, or chills.

 

PAST MEDICAL HISTORY:

1.  Gastroesophageal reflux disease.

2.  Obesity.

3.  Anemia.

4.  Hypokalemia.

 

PAST SURGICAL HISTORY:  Laparoscopic band placement.

 

MEDICATIONS:  Include multivitamins.

 

ALLERGIES:  She has no known drug allergies.

 

SOCIAL HISTORY:  She does not smoke.  She rarely drinks alcohol.  She has 3 young children.  She work
s as a  and she is visiting her boyfriend from [place].

 

REVIEW OF SYSTEMS:  Cerebrovascular:  No dizziness or visual disturbances. Cardiovascular:  No chest 
pain or shortness of breath.  Pulmonary:  No wheezing or hemoptysis.  GI:  As per above.  :  No urg
ency or hematuria.

 

PHYSICAL EXAM:  Temperature 98, pulse 62, blood pressure 94/56, respirations 16. In general, she is a
 well-developed, well-nourished female, who appeared to be in no apparent distress.  She is awake, al
ert, and conversive and very pleasant.  Oral mucosa was slightly dry.  Lungs were clear to auscultati
on with normal respiratory effort.  Heart was regular rate and rhythm without murmurs, rubs, or johnson
ps. Abdomen is soft and nondistended.  She has laparoscopic incisions, well healed in the upper abdom
en.  There is a palpable port in the epigastric area without signs of redness or tenderness.

 

LABORATORY DATA:  Laboratory values included a normal white count of 4.1 with a hemoglobin of 7.4 wit
h an MCV of 62.  Electrolytes were within normal limits other than low potassium.  Total bilirubin 1.
1 and total protein is 6.3.

 

IMPRESSION:  Persistent nausea and vomiting over the past week in a patient who has had a lap band pl
aced 9 years ago.  She states over the past several months, she has had some intermittent discomfort,
 but her symptoms have worsened.  CT scan as above.

 

PLAN:

1.  The patient is going to be admitted to the hospitalist service for IV hydration, will be kept n.p
.o.

2.  Proton-pump inhibitors will be started.

3.  She has undergone an upper endoscopy with Dr. Chen from Gastroenterology.  I will review these 
results with her.  This shows fairly severe distal esophagitis narrowing at the band site and a izzy
l distal stomach.

4.  I discussed her care with Dr. Garcia who will see her tomorrow.  Most likely plan will be remova
l of the laparoscopic band, but we will await his decision as to timing and any other evaluation that
 may be indicated.

5.  We will follow her closely with you.

 

 [id number]/CPS #: [id number]

## 2018-08-11 LAB
BASOPHILS # BLD AUTO: 0 10^3/UL (ref 0–0.2)
EOSINOPHIL # BLD AUTO: 0.2 10^3/UL (ref 0–0.6)
HCT VFR BLD AUTO: 21 % (ref 35–47)
HGB BLD-MCNC: 6.4 G/DL (ref 12–16)
HGB BLD-MCNC: 6.4 G/DL (ref 12–16)
INR PPP/BLD: 1.18 (ref 0.77–1.02)
LYMPHOCYTES # BLD AUTO: 1.4 10^3/UL (ref 1–4.8)
MCH RBC QN AUTO: 19 PG (ref 27–31)
MCHC RBC AUTO-ENTMCNC: 31 G/DL (ref 31–36)
MCV RBC AUTO: 62 FL (ref 80–97)
MONOCYTES # BLD AUTO: 0.3 10^3/UL (ref 0–0.8)
NEUTROPHILS # BLD AUTO: 1.8 10^3/UL (ref 1.5–7.7)
NRBC # BLD AUTO: 0 10^3/UL
NRBC BLD QL AUTO: 0
PLATELET # BLD AUTO: 270 10^3/UL (ref 150–450)
RBC # BLD AUTO: 3.32 10^6/UL (ref 4.6–6.2)
RBC # BLD AUTO: 3.35 10^6/UL (ref 4–5.4)
RETICULOCYTE PRODUCTION INDEX: 0.3 %
RETICULOCYTE PRODUCTION INDEX: 0.6 % (ref 0.5–1.5)
WBC # BLD AUTO: 3.6 10^3/UL (ref 3.5–10.8)

## 2018-08-11 PROCEDURE — 30233N1 TRANSFUSION OF NONAUTOLOGOUS RED BLOOD CELLS INTO PERIPHERAL VEIN, PERCUTANEOUS APPROACH: ICD-10-PCS | Performed by: INTERNAL MEDICINE

## 2018-08-11 RX ADMIN — SODIUM CHLORIDE SCH MLS/HR: 900 IRRIGANT IRRIGATION at 03:09

## 2018-08-11 RX ADMIN — PANTOPRAZOLE SODIUM SCH MG: 40 INJECTION, POWDER, FOR SOLUTION INTRAVENOUS at 08:49

## 2018-08-11 RX ADMIN — HYDROMORPHONE HYDROCHLORIDE PRN MG: 1 INJECTION, SOLUTION INTRAMUSCULAR; INTRAVENOUS; SUBCUTANEOUS at 08:46

## 2018-08-11 RX ADMIN — PROCHLORPERAZINE EDISYLATE PRN MEQ: 5 INJECTION INTRAMUSCULAR; INTRAVENOUS at 12:47

## 2018-08-11 RX ADMIN — HYDROMORPHONE HYDROCHLORIDE PRN MG: 1 INJECTION, SOLUTION INTRAMUSCULAR; INTRAVENOUS; SUBCUTANEOUS at 12:27

## 2018-08-11 RX ADMIN — HYDROMORPHONE HYDROCHLORIDE PRN MG: 1 INJECTION, SOLUTION INTRAMUSCULAR; INTRAVENOUS; SUBCUTANEOUS at 23:18

## 2018-08-11 RX ADMIN — HYDROMORPHONE HYDROCHLORIDE PRN MG: 1 INJECTION, SOLUTION INTRAMUSCULAR; INTRAVENOUS; SUBCUTANEOUS at 18:30

## 2018-08-11 RX ADMIN — ONDANSETRON PRN MG: 2 INJECTION INTRAMUSCULAR; INTRAVENOUS at 09:29

## 2018-08-11 RX ADMIN — PANTOPRAZOLE SODIUM SCH MG: 40 INJECTION, POWDER, FOR SOLUTION INTRAVENOUS at 22:06

## 2018-08-11 RX ADMIN — ONDANSETRON PRN MG: 2 INJECTION INTRAMUSCULAR; INTRAVENOUS at 18:30

## 2018-08-11 RX ADMIN — SODIUM CHLORIDE SCH MLS/HR: 900 IRRIGANT IRRIGATION at 01:59

## 2018-08-11 RX ADMIN — SODIUM CHLORIDE SCH MLS/HR: 900 IRRIGANT IRRIGATION at 04:15

## 2018-08-11 RX ADMIN — HYDROMORPHONE HYDROCHLORIDE PRN MG: 1 INJECTION, SOLUTION INTRAMUSCULAR; INTRAVENOUS; SUBCUTANEOUS at 03:41

## 2018-08-11 RX ADMIN — SODIUM CHLORIDE SCH MLS/HR: 900 IRRIGANT IRRIGATION at 01:03

## 2018-08-11 NOTE — PN
Subjective


Date of Service: 08/11/18


Interval History: 





Pt c/o no BM x 7 days, no flatus in the past 2-3 days. abd pian described as 

severe, colicky in b/l lower quadrants of abd, resolving after pain meds, now c/

o mild tenderness in lower abd


H/o blood transfusion in 2010 and severe iron deff anemia on IV iron as 

outpatient in the past , then on PO iron supplement which pt stopped due to GI 

intolerance. H/o menorrhagia





Objective


Active Medications: 








Hydromorphone HCl (Dilaudid Inj*)  0.5 mg IV SLOW PU Q4H PRN


   PRN Reason: PAIN


   Last Admin: 08/11/18 08:46 Dose:  0.5 mg


Sodium Chloride (Ns 0.9% 1000 Ml*)  1,000 mls @ 125 mls/hr IV PER RATE [facility]


   Last Admin: 08/11/18 04:15 Dose:  125 mls/hr


Sodium Chloride (Ns 0.9% 1000 Ml*)  1,000 mls @ 0 mls/hr IV WIDE OPEN [facility]


   Stop: 08/12/18 02:01


   Last Admin: 08/11/18 03:09 Dose:  999 mls/hr


Ondansetron HCl (Zofran Inj*)  4 mg IV Q6H PRN


   PRN Reason: NAUSEA


   Last Admin: 08/11/18 09:29 Dose:  4 mg


Pantoprazole Sodium (Protonix Iv*)  40 mg IV Q12H [facility]


   Last Admin: 08/11/18 08:49 Dose:  40 mg


Prochlorperazine Edisylate (Compazine Inj*)  10 mg IV Q6H PRN


   PRN Reason: NAUSEA/VOMITING


   Last Admin: 08/10/18 15:47 Dose:  10 meq








 Vital Signs - 8 hr











  08/11/18 08/11/18 08/11/18





  03:24 03:41 04:45


 


Temperature 98.1 F  


 


Pulse Rate 89  


 


Respiratory 16 18 16





Rate   


 


Blood Pressure 105/60  





(mmHg)   


 


O2 Sat by Pulse 99  





Oximetry   














  08/11/18 08/11/18 08/11/18





  07:37 08:00 08:46


 


Temperature 97.6 F  


 


Pulse Rate 60  


 


Respiratory 18 18 18





Rate   


 


Blood Pressure 103/55  





(mmHg)   


 


O2 Sat by Pulse 98  





Oximetry   











Oxygen Devices in Use Now: None


Appearance: 39 yo f in nAD, aAOx3


Eyes: No Scleral Icterus, PERRLA


Ears/Nose/Mouth/Throat: NL Teeth, Lips, Gums, Mucous Membranes Moist


Neck: NL Appearance and Movements; NL JVP, Trachea Midline


Respiratory: Symmetrical Chest Expansion and Respiratory Effort, Clear to 

Auscultation


Cardiovascular: NL Sounds; No Murmurs; No JVD, RRR


Abdominal: - - mild b/l lower Q's tenderness, no rebound, no guarding, 

decreased BS


Lymphatic: No Cervical Adenopathy


Extremities: No Edema, No Clubbing, Cyanosis


Skin: No Rash or Ulcers, No Nodules or Sclerosis


Neurological: Alert and Oriented x 3, NL Muscle Strength and Tone


Result Diagrams: 


 08/11/18 07:05





 08/11/18 06:02


Microbiology and Other Data: 


 Microbiology











 08/10/18 14:07 CLOtest - Final





 Gastric Antrum 














Assess/Plan/Problems-Billing


Assessment: 39 yo F with h/o lap band in 2007 and hospitalization for 

dehydration and anemia in 2010 and in the beginning 2018 in [place] presented with 

no BM x 7 days, intractable N/V and lower abd pain. EGD sgows severe espohagitis











- Patient Problems


(1) Esophagitis determined by endoscopy


Comment: cont NPO prior to Dr. Sesay' evaluation


cont IV Protonix


may need gastric band removed


   





(2) Dehydration


Comment: transient low BP at night, ow improved with IVF


cont IVF when NPO   





(3) Microcytic anemia


Comment: H/o blood transfusion in 2010 and severe iron deff anemia on IV iron 

as outpatient in the past , then on PO iron supplement which pt stopped due to 

GI intolerance. H/o menorrhagia


today hb down t 6.4 -suspect hemodilution, no signs or symptoms of bleeding


will transfuse 1 u PRBC, if tolerated OK will start iron IV infusions   





(4) DVT prophylaxis


Comment: low risk, SCD's   


Status and Disposition: 


OBV will be changed to inpatient

## 2018-08-11 NOTE — PRO
CC:  Ezio Davison NP; Dr. Jane Valente; Dr. Joan Chen

 

GASTROENTEROLOGY OPERATIVE REPORT:

 

DATE OF PROCEDURE:  08/10/18

 

OPERATIVE PROCEDURE:  Esophagogastroduodenoscopy to second portion of duodenum

 

GASTROENTEROLOGIST:  Joan Chen DO

 

ANESTHESIA:

1.  Midazolam 5 mg IV.

2.  Fentanyl 75 mcg IV.

 

OTHER MEDICATIONS:  

1.  Zofran 4 mg IV.

 

HISTORY OF PRESENT ILLNESS:  Odalis is a 38-year-old female with a history GERD 
and gastric Lap-Band placed 9 years ago, who presents to [facility] 
ER with intractable nausea and vomiting for 7 days.  She is currently not on 
PPI therapy for history of GERD.  Her last endoscopy was 9 years ago after her 
gastric Lap-Band. Yesterday, she did have some fluid removed from her Lap-Band, 
which did not help alleviate her symptoms.

 

PREOPERATIVE DIAGNOSES:

1.  Intractable nausea, vomiting.

2.  History of gastric Lap-Band.

 

POSTOPERATIVE DIAGNOSES:

1.  Normal-appearing mid and proximal esophagus.

2.  Severe distal esophagitis with cobblestoning and biopsies.

3.  Tight Gastric Lap-Band noted at 40 cm from the incisors.

4.  Irregular-appearing Z-line at 35 cm from the incisors.

5.  Minimal antral gastritis with CLOtest to rule out Helicobacter pylori.

6.  Normal-appearing duodenum to the second portion with biopsies to rule out 
celiac disease.

 

RECOMMENDATIONS:

1.  We will follow up with path results.

2.  We will start the patient on pantoprazole therapy 40 mg IV twice daily. 
Carafate 1 g b.i.d. may be added once she is able to tolerate oral medications.

3.  Discussed with primary team, Tyrone Davison NP and surgeon Dr. Deras.  
Dr. Montez will likely remove lap band next week as this is likely the cause 
of patient's current symptoms.  

4.  Further recommendations will be provided as the patient's clinical course 
progresses.

 

DESCRIPTION OF PROCEDURE:  Esophagogastroduodenoscopy was explained in detail 
to the patient.  The risks, benefits, complications, alternatives, 
possibilities of missed lesions were explained and understood.  Complications 
included, but were not limited to reaction to anesthesia, aspiration, increased 
risk of bleeding, infection and perforation.  All questions were answered.  The 
patient demonstrated understanding of the conversation.  Informed consent was 
obtained.  Next, the patient was brought to the endoscopy suite, placed in the 
left lateral recumbent position, where blood pressure, cardiac, and oxygen 
monitors were applied.  The patient was found to be a fit candidate for 
moderate anesthesia.  After adequate IV sedation was achieved, a bite-block was 
placed.  Next, a standard adult Olympus endoscope was inserted per os under 
direct visualization to the first and second portion of the duodenum. These 
areas were grossly normal appearing.  Cold forceps biopsies were obtained to 
rule out celiac disease.  Further withdrawal into the gastric lumen revealed 
very minimal erythema in the antrum consistent with minimal antral change 
gastritis.  A CLOtest was performed to rule out H. pylori.  On retroflexion, 
the patient had a tight gastric cardia sling likely from the gastric Lap-Band.  
The Lap-Band was noted at 40-cm from the incisors and seen causing 
significantly narrowing the lumen of the esophagus creating a hiatal hernia-
type sac from the distal esophagus to the tight junction from gastric lap band.
  Further withdrawal into the distal esophagus revealed severe LA grade D 
distal esophagitis with cobblestoning.  Cold forceps biopsies were obtained 
from this area.  The Z-line appeared to be irregular located at 35 cm.  The 
rest of the tubular esophagus was normal appearing.  Air was then removed from 
the patient.  Endoscope was removed from the patient.  The patient tolerated 
the procedure well.  There were no immediate complications.  After a period of 
observation, the patient was transferred back the to the medical floor in 
stable condition.

 

Thank you, Ezio Davison, for allowing us to participate in the care of your 
patient.  If you should have any further questions or concerns, please do not 
hesitate to contact us.

 

 [id number]/CPS #: [id number]

DAYSI

## 2018-08-11 NOTE — PN
Progress Note





- Progress Note


Date of Service: 08/11/18 - Gastroenterology


Note: 





Patient seen and examined. No new overnight issues. Emesis has improved. Still 

nauseated. Feels a little better after full night's sleep. Abdominal pain 

slightly better. No fevers/chills. No bowel movement yet. Receiving one unit of 

prbcs this am due to low hemoglobin.





Vital Signs:











Temp Pulse Resp BP Pulse Ox


 


 97.9 F   55   16   96/52   97 


 


 08/11/18 13:19  08/11/18 13:19  08/11/18 13:19  08/11/18 13:19  08/11/18 13:19








Physical Examination:


General: AAOx3. NAD. Less pale today. 


Abdomen: Soft. Non-tender. Non-distended. +BS. 





 Laboratory Results - last 24 hr











  08/11/18 08/11/18 08/11/18





  06:02 06:02 06:02


 


WBC  3.6  


 


RBC  3.35 L  


 


RBC (Retic)   


 


Hgb  6.4 L*  


 


Hct  21 L  


 


HCT (Retic)   


 


MCV  62 L  


 


MCH  19 L  


 


MCHC  31  


 


RDW  18 H  


 


Plt Count  270  


 


MPV  8.4  


 


Neut % (Auto)  49.1  


 


Lymph % (Auto)  37.9  


 


Mono % (Auto)  7.5 H  


 


Eos % (Auto)  4.8  


 


Baso % (Auto)  0.7  


 


Absolute Neuts (auto)  1.8  


 


Absolute Lymphs (auto)  1.4  


 


Absolute Monos (auto)  0.3  


 


Absolute Eos (auto)  0.2  


 


Absolute Basos (auto)  0  


 


Absolute Nucleated RBC  0  


 


Nucleated RBC %  0  


 


Retic Count, Calc   


 


Corrected Retic Count   


 


Retic Shift Factor   


 


Retic Production Index   


 


Immature Retic Fraction   


 


Mean Retic Volume   


 


INR (Anticoag Therapy)   1.18 H 


 


Sodium    139


 


Potassium    3.6


 


Chloride    113 H


 


Carbon Dioxide    23


 


Anion Gap    3


 


BUN    5 L


 


Creatinine    0.39 L


 


Est GFR ( Amer)    225.0


 


Est GFR (Non-Af Amer)    186.0


 


BUN/Creatinine Ratio    12.8


 


Glucose    85


 


Calcium    7.6 L


 


Iron    < 15 L


 


TIBC    326


 


% Saturation    5 L


 


Unsat Iron Binding    [phone]


 


Transferrin    233


 


Ferritin    2.9 L


 


Blood Type   


 


Antibody Screen   


 


Crossmatch   














  08/11/18 08/11/18





  06:02 07:05


 


WBC  


 


RBC  


 


RBC (Retic)   3.32 L


 


Hgb   6.4 L*


 


Hct   21 L


 


HCT (Retic)   21 L


 


MCV  


 


MCH  


 


MCHC  


 


RDW  


 


Plt Count  


 


MPV  


 


Neut % (Auto)  


 


Lymph % (Auto)  


 


Mono % (Auto)  


 


Eos % (Auto)  


 


Baso % (Auto)  


 


Absolute Neuts (auto)  


 


Absolute Lymphs (auto)  


 


Absolute Monos (auto)  


 


Absolute Eos (auto)  


 


Absolute Basos (auto)  


 


Absolute Nucleated RBC  


 


Nucleated RBC %  


 


Retic Count, Calc   1.3


 


Corrected Retic Count   0.6


 


Retic Shift Factor   2.0


 


Retic Production Index   0.30


 


Immature Retic Fraction   0.24


 


Mean Retic Volume   93.3


 


INR (Anticoag Therapy)  


 


Sodium  


 


Potassium  


 


Chloride  


 


Carbon Dioxide  


 


Anion Gap  


 


BUN  


 


Creatinine  


 


Est GFR ( Amer)  


 


Est GFR (Non-Af Amer)  


 


BUN/Creatinine Ratio  


 


Glucose  


 


Calcium  


 


Iron  


 


TIBC  


 


% Saturation  


 


Unsat Iron Binding  


 


Transferrin  


 


Ferritin  


 


Blood Type  B Positive 


 


Antibody Screen  Negative 


 


Crossmatch  See Detail 











A/P: 37 yo female s/p gastric Lap-band 9 years ago and GERD who presented with 

abdominal pain and intractable nausea/emesis. EGD yesterday showed severe 

distal esophagitis and narrowing from gastric Lap-band creating a tight hernia-

type sac in the gastric cardia. Surgery is following for possible Lap-band 

removal early next week since symptoms persist despite removing all the fluid 

from Lap-band. 





1. Intractable nausea/emesis


~Improving.


~PPI BID for 6 weeks, then may decrease to once daily. 


~May add carafate BID once able to tolerate oral intake if symptoms persist. 


~Bx from EGD pending. Clotest was negative for H.pylori. 


~Will need to see GI as outpatient for repeat EGD to ensure healing of 

esophagitis. 





2. Gastric Lap-band


~Surgery following. 


~Planning to remove Lap-band early next week. 





3. Iron-deficiency Anemia


~Transfusing one prbcs today.


~Follow hgb. 





Please call with any further issues or concerns. 





Joan Chen D.O.

## 2018-08-11 NOTE — PN
Progress Note





- Progress Note


Date of Service: 08/11/18


SOAP: 


Subjective:


Chart and radiology images reviewed.  She had EGD yesterday noting severe 

erosive GERD.  She is better today with no emesis.  She states she had wanted 

her LAP-BAND removed 6 months ago when she had a similar episode of dysphagia/

dry heaves.








Objective:





 Vital Signs











Temp  97.9 F   08/11/18 10:17


 


Pulse  68   08/11/18 10:17


 


Resp  15   08/11/18 10:17


 


BP  110/62   08/11/18 10:17


 


Pulse Ox  96   08/11/18 10:17





Gen:  NAD


Abd: obese, soft; port palpated in upper abdomen; non-tender.





 Intake & Output











 08/10/18 08/11/18 08/11/18





 18:59 06:59 18:59


 


Intake Total 55 5200 685


 


Output Total 275 1100 0


 


Balance -220 4100 685


 


Weight 177 lb  


 


Intake:   


 


  IV Fluids  4950 685


 


    NS  2975 685


 


  IVPB 55 250 


 


    Potassium 55 100 


 


  Oral  0 0


 


Output:   


 


  Urine 275 1100 0








 Laboratory Results - last 24 hr











  08/11/18 08/11/18 08/11/18





  06:02 06:02 06:02


 


WBC  3.6  


 


RBC  3.35 L  


 


Hgb  6.4 L*  


 


Hct  21 L  


 


MCV  62 L  


 


MCH  19 L  


 


MCHC  31  


 


RDW  18 H  


 


Plt Count  270  


 


MPV  8.4  


 


Neut % (Auto)  49.1  


 


Lymph % (Auto)  37.9  


 


Mono % (Auto)  7.5 H  


 


Eos % (Auto)  4.8  


 


Baso % (Auto)  0.7  


 


Absolute Neuts (auto)  1.8  


 


Absolute Lymphs (auto)  1.4  


 


Absolute Monos (auto)  0.3  


 


Absolute Eos (auto)  0.2  


 


Absolute Basos (auto)  0  


 


Absolute Nucleated RBC  0  


 


Nucleated RBC %  0  


 


INR (Anticoag Therapy)   1.18 H 


 


Sodium    139


 


Potassium    3.6


 


Chloride    113 H


 


Carbon Dioxide    23


 


Anion Gap    3


 


BUN    5 L


 


Creatinine    0.39 L


 


Est GFR ( Amer)    225.0


 


Est GFR (Non-Af Amer)    186.0


 


BUN/Creatinine Ratio    12.8


 


Glucose    85


 


Calcium    7.6 L


 


Blood Type   


 


Antibody Screen   


 


Crossmatch   














  08/11/18 08/11/18





  06:02 07:05


 


WBC  


 


RBC  


 


Hgb   6.4 L*


 


Hct   21 L


 


MCV  


 


MCH  


 


MCHC  


 


RDW  


 


Plt Count  


 


MPV  


 


Neut % (Auto)  


 


Lymph % (Auto)  


 


Mono % (Auto)  


 


Eos % (Auto)  


 


Baso % (Auto)  


 


Absolute Neuts (auto)  


 


Absolute Lymphs (auto)  


 


Absolute Monos (auto)  


 


Absolute Eos (auto)  


 


Absolute Basos (auto)  


 


Absolute Nucleated RBC  


 


Nucleated RBC %  


 


INR (Anticoag Therapy)  


 


Sodium  


 


Potassium  


 


Chloride  


 


Carbon Dioxide  


 


Anion Gap  


 


BUN  


 


Creatinine  


 


Est GFR ( Amer)  


 


Est GFR (Non-Af Amer)  


 


BUN/Creatinine Ratio  


 


Glucose  


 


Calcium  


 


Blood Type  B Positive 


 


Antibody Screen  Negative 


 


Crossmatch  See Detail 














Assessment:


Dysphagia, GERD s/p LAGB in [place], 2009.  Improved with hydration and NPO status.  

Chronic MARIAMA being transfused.


She should have her LAGB removed as she is having recurrent issues despite no 

fluid in the band.





Plan:


Will keep NPO.  PPI.  Possible OR early next week for removal LAP-BAND.

## 2018-08-11 NOTE — CONS
CC:  Ezio Davison NP; Dr. Deras; Joan Chen DO

 

GASTROENTEROLOGY CONSULTATION REPORT:

 

DATE OF CONSULT:  08/10/18

 

HOSPITAL PROVIDER:  Ezio Davison NP

 

PRIMARY CARE PROVIDER:  Virginia Dobbins NP

 

REASON FOR CONSULT:  Intractable nausea, vomiting.

 

HISTORY OF PRESENT ILLNESS:  Odalis is a 38-year-old female with a previous 
history of Lap-Band procedure done approximately 9 years ago, who has been 
visiting from [place] approximately 1 week ago.  She states she had 1 episode 
of diarrhea last week and since then, she has not had a bowel movement due to 
decreased oral intake.  She does admit to a general history of chronic 
constipation.  She states she has been feeling significantly nauseous with 
intermittent emesis over the last week.  She also admits to lower abdominal 
cramping.  She has been unable to keep any food down for approximately a week, 
which prompted her to present herself to the emergency room.  These symptoms 
are similar to previous episodes over the last year but are more severe this 
past week.  She had been told previously from a variety of physicians that she 
may need to have her Lap-Band removed due to persistent symptoms.  She is not 
on antacid therapy at home.  She does admit to marijuana use and did use it 
once 3 days ago in an attempt to alleviate her symptoms and prior to this, she 
last used 7 months ago.  She has had a previous endoscopy shortly after her Lap-
Band was placed nine years ago.  She does not recall the results of this test.  
She denies prior colonoscopy.  She denies family history of gastrointestinal 
malignancies.  She denies rectal bleeding, melena, and hematemesis.  She was 
admitted for rehydration and further evaluation of the patient's Lap-Band as 
well as her intractable vomiting.  Of note, she did have her Lap-Band evaluated 
from a surgeon yesterday and some fluid was removed from the Lap-Band; however, 
her symptoms did not improve with this.  

 

PAST MEDICAL HISTORY:

1.  GERD.

2.  Iron-deficiency anemia.

3.  Hypokalemia.

 

PAST SURGICAL HISTORY:  Lap-Band approximately 9 years ago.

 

HOME MEDICATIONS:  Multivitamin daily.

 

ALLERGIES TO MEDICATIONS:  No known allergies.

 

FAMILY HISTORY:  Denies history of gastrointestinal malignancies.

 

SOCIAL HISTORY:  She denies tobacco use.  Rarely drinks alcohol. Occasional 
marijuana use.  Visiting from [place].

 

REVIEW OF SYSTEMS:  On a 14-point scale have been reviewed.  All pertinent 
positives and negatives have been noted above in the HPI.

 

PHYSICAL EXAM:  Vital Signs:  Temperature 98, pulse 62, respirations 16, blood 
pressure 94/56, oxygenation 98% on room air.  Generally, the patient is alert 
and oriented x3.  Slightly pale.  HEENT:  Normocephalic, atraumatic. 
Extraocular muscles are intact.  Anicteric sclerae bilaterally.  Cardiovascular 
Exam:  Regular rate and rhythm.  Pulmonary Exam:  Clear to auscultation 
bilaterally.  Abdominal Exam:  Obese, positive bowel sounds, soft, nontender, 
nondistended.  No rebound, guarding, or rigidity.  Lap-Band is noted.  
Extremities: No clubbing, cyanosis, or edema.  Warm to touch.  Neurological Exam
:  No gross focal deficits are appreciated at this time.

 

DIAGNOSTIC STUDIES/LAB DATA:  WBC is 4.1, hemoglobin 7.4, hematocrit 24, MCV 62
, platelets 341.  Sodium 140, potassium 3.0, chloride 106, CO2 of 28, anion gap 
6, BUN 10, creatinine 0.52, lactic acid 0.9, calcium 8.8, magnesium 1.8.  Total 
bilirubin 1.10, AST 6, ALT 8, alkaline phosphatase 31.  CRP less than 1.0.  
Total protein 6.3, albumin 3.6, lipase 11.  Beta HCG less than 0.60.

 

ASSESSMENT AND PLAN:  Odalis is a 38-year-old female with a previous history 
GERD and a Lap-Band that was placed 9 years ago.  She has been experiencing 
recurrent symptoms of intractable nausea, vomiting, and abdominal discomfort 
over the last year.  Over the last week, her symptoms became more severe.  She 
did present yesterday for removal of fluid from her Lap-band, which did not 
seem to alleviate her symptoms.  She has been unable to keep fluid down for the 
last week and was found to be hypovolemic on admission with hypokalemia and 
hypomagnesemia.  She denies sick contacts at this time.  She is not on antacid 
therapy as an outpatient despite her previous history of gastroesophageal 
reflux disease.  Given these severe symptoms, we will perform an endoscopy for 
further evaluation today and to further evaluate Lap-band.  The patient is to 
maintain n.p.o. status.  We will start her pantoprazole therapy 40 mg IV daily 
for now.  She will be given a dose of Zofran prior to an endoscopy due to 
recurrent emesis here in the endoscopy department.  Further recommendations 
will be provided as the patient's clinical course progresses.

 

Thank you, Ezio Davison, for allowing us to participate in the care of your 
patient.  If you should have any further questions or concerns, please do not 
hesitate to contact us.

 

 [id number]/[facility] #: [id number]

DAYSI

## 2018-08-12 LAB
BASOPHILS # BLD AUTO: 0 10^3/UL (ref 0–0.2)
EOSINOPHIL # BLD AUTO: 0.2 10^3/UL (ref 0–0.6)
HCT VFR BLD AUTO: 24 % (ref 35–47)
HGB BLD-MCNC: 7.3 G/DL (ref 12–16)
LYMPHOCYTES # BLD AUTO: 1.7 10^3/UL (ref 1–4.8)
MCH RBC QN AUTO: 20 PG (ref 27–31)
MCHC RBC AUTO-ENTMCNC: 31 G/DL (ref 31–36)
MCV RBC AUTO: 65 FL (ref 80–97)
MONOCYTES # BLD AUTO: 0.3 10^3/UL (ref 0–0.8)
NEUTROPHILS # BLD AUTO: 1.6 10^3/UL (ref 1.5–7.7)
NRBC # BLD AUTO: 0 10^3/UL
NRBC BLD QL AUTO: 0
PLATELET # BLD AUTO: 283 10^3/UL (ref 150–450)
RBC # BLD AUTO: 3.69 10^6/UL (ref 4–5.4)
WBC # BLD AUTO: 3.9 10^3/UL (ref 3.5–10.8)

## 2018-08-12 RX ADMIN — ONDANSETRON PRN MG: 2 INJECTION INTRAMUSCULAR; INTRAVENOUS at 19:27

## 2018-08-12 RX ADMIN — HYDROMORPHONE HYDROCHLORIDE PRN MG: 1 INJECTION, SOLUTION INTRAMUSCULAR; INTRAVENOUS; SUBCUTANEOUS at 07:23

## 2018-08-12 RX ADMIN — HYDROMORPHONE HYDROCHLORIDE PRN MG: 1 INJECTION, SOLUTION INTRAMUSCULAR; INTRAVENOUS; SUBCUTANEOUS at 12:17

## 2018-08-12 RX ADMIN — PROCHLORPERAZINE EDISYLATE PRN MEQ: 5 INJECTION INTRAMUSCULAR; INTRAVENOUS at 22:50

## 2018-08-12 RX ADMIN — SODIUM CHLORIDE SCH MLS/HR: 900 IRRIGANT IRRIGATION at 18:26

## 2018-08-12 RX ADMIN — SODIUM CHLORIDE SCH MLS/HR: 900 IRRIGANT IRRIGATION at 00:43

## 2018-08-12 RX ADMIN — PANTOPRAZOLE SODIUM SCH MG: 40 INJECTION, POWDER, FOR SOLUTION INTRAVENOUS at 21:13

## 2018-08-12 RX ADMIN — PANTOPRAZOLE SODIUM SCH MG: 40 INJECTION, POWDER, FOR SOLUTION INTRAVENOUS at 07:26

## 2018-08-12 RX ADMIN — ONDANSETRON PRN MG: 2 INJECTION INTRAMUSCULAR; INTRAVENOUS at 07:31

## 2018-08-12 RX ADMIN — HYDROMORPHONE HYDROCHLORIDE PRN MG: 1 INJECTION, SOLUTION INTRAMUSCULAR; INTRAVENOUS; SUBCUTANEOUS at 19:27

## 2018-08-12 RX ADMIN — ONDANSETRON PRN MG: 2 INJECTION INTRAMUSCULAR; INTRAVENOUS at 12:22

## 2018-08-12 NOTE — PN
Progress Note





- Progress Note


Date of Service: 08/12/18


SOAP: 


Subjective:


Mid abdominal pain and nausea again last night.  No emesis.  








Objective:


 Vital Signs











Temp  98.1 F   08/12/18 07:09


 


Pulse  75   08/12/18 07:09


 


Resp  20   08/12/18 07:23


 


BP  120/68   08/12/18 07:09


 


Pulse Ox  99   08/12/18 07:09





Gen: NAD


Abd: Soft, NT; port palpable in epigastrium.


 Intake & Output











 08/11/18 08/12/18 08/12/18





 18:59 06:59 18:59


 


Intake Total 685 1335 


 


Output Total 900 700 


 


Balance -215 635 


 


Intake:   


 


  IV Fluids 685 330 


 


     330 


 


  IVPB  1005 


 


    Thiamine  1005 


 


  Oral 0 0 


 


Output:   


 


  Urine 900 700 








 Laboratory Results - last 24 hr











  08/11/18 08/11/18 08/11/18





  06:02 06:02 07:05


 


WBC   


 


RBC   


 


RBC (Retic)    3.32 L


 


Hgb    6.4 L*


 


Hct    21 L


 


HCT (Retic)    21 L


 


MCV   


 


MCH   


 


MCHC   


 


RDW   


 


Plt Count   


 


MPV   


 


Neut % (Auto)   


 


Lymph % (Auto)   


 


Mono % (Auto)   


 


Eos % (Auto)   


 


Baso % (Auto)   


 


Absolute Neuts (auto)   


 


Absolute Lymphs (auto)   


 


Absolute Monos (auto)   


 


Absolute Eos (auto)   


 


Absolute Basos (auto)   


 


Absolute Nucleated RBC   


 


Nucleated RBC %   


 


Retic Count, Calc    1.3


 


Corrected Retic Count    0.6


 


Retic Shift Factor    2.0


 


Retic Production Index    0.30


 


Immature Retic Fraction    0.24


 


Mean Retic Volume    93.3


 


Sodium  139  


 


Potassium  3.6  


 


Chloride  113 H  


 


Carbon Dioxide  23  


 


Anion Gap  3  


 


BUN  5 L  


 


Creatinine  0.39 L  


 


Est GFR ( Amer)  225.0  


 


Est GFR (Non-Af Amer)  186.0  


 


BUN/Creatinine Ratio  12.8  


 


Glucose  85  


 


Calcium  7.6 L  


 


Phosphorus   


 


Magnesium   


 


Iron  < 15 L  


 


TIBC  326  


 


% Saturation  5 L  


 


Unsat Iron Binding  [phone]  


 


Transferrin  233  


 


Ferritin  2.9 L  


 


Blood Type   B Positive 


 


Antibody Screen   Negative 


 


Crossmatch   See Detail 














  08/12/18 08/12/18





  04:56 04:56


 


WBC  3.9 


 


RBC  3.69 L 


 


RBC (Retic)  


 


Hgb  7.3 L 


 


Hct  24 L 


 


HCT (Retic)  


 


MCV  65 L 


 


MCH  20 L 


 


MCHC  31 


 


RDW  20 H 


 


Plt Count  283 


 


MPV  8.4 


 


Neut % (Auto)  41.7 


 


Lymph % (Auto)  43.1 


 


Mono % (Auto)  8.3 H 


 


Eos % (Auto)  6.1 H 


 


Baso % (Auto)  0.8 


 


Absolute Neuts (auto)  1.6 


 


Absolute Lymphs (auto)  1.7 


 


Absolute Monos (auto)  0.3 


 


Absolute Eos (auto)  0.2 


 


Absolute Basos (auto)  0 


 


Absolute Nucleated RBC  0 


 


Nucleated RBC %  0 


 


Retic Count, Calc  


 


Corrected Retic Count  


 


Retic Shift Factor  


 


Retic Production Index  


 


Immature Retic Fraction  


 


Mean Retic Volume  


 


Sodium   139


 


Potassium   3.3 L


 


Chloride   111


 


Carbon Dioxide   25


 


Anion Gap   3


 


BUN   3 L


 


Creatinine   0.41 L


 


Est GFR ( Amer)   212.4


 


Est GFR (Non-Af Amer)   175.5


 


BUN/Creatinine Ratio   7.3 L


 


Glucose   74


 


Calcium   7.6 L


 


Phosphorus   2.4 L


 


Magnesium   1.6 L


 


Iron  


 


TIBC  


 


% Saturation  


 


Unsat Iron Binding  


 


Transferrin  


 


Ferritin  


 


Blood Type  


 


Antibody Screen  


 


Crossmatch  

















Assessment:


Dysphagia, GERD s/p LAGB in [place], 2009.  Improved with hydration and NPO status.  

Chronic MARIAMA responded to txfn.


She should have her LAGB removed as she is having recurrent issues despite no 

fluid in the band.


Electrolyte abnormalities.











Plan: 


Replete electrolytes.


Sips.


OR tomorrow AM with Dr. Smalls for removal of LAGB.  


D/w patient who understands and agrees.

## 2018-08-13 PROCEDURE — 0DP64CZ REMOVAL OF EXTRALUMINAL DEVICE FROM STOMACH, PERCUTANEOUS ENDOSCOPIC APPROACH: ICD-10-PCS | Performed by: SURGERY

## 2018-08-13 RX ADMIN — METOCLOPRAMIDE HYDROCHLORIDE SCH: 10 TABLET ORAL at 14:39

## 2018-08-13 RX ADMIN — HYDROMORPHONE HYDROCHLORIDE PRN MG: 1 INJECTION, SOLUTION INTRAMUSCULAR; INTRAVENOUS; SUBCUTANEOUS at 00:06

## 2018-08-13 RX ADMIN — HYDROMORPHONE HYDROCHLORIDE PRN MG: 1 INJECTION, SOLUTION INTRAMUSCULAR; INTRAVENOUS; SUBCUTANEOUS at 12:26

## 2018-08-13 RX ADMIN — MORPHINE SULFATE PRN MG: 2 INJECTION, SOLUTION INTRAMUSCULAR; INTRAVENOUS at 15:34

## 2018-08-13 RX ADMIN — PANTOPRAZOLE SODIUM SCH MG: 40 INJECTION, POWDER, FOR SOLUTION INTRAVENOUS at 09:35

## 2018-08-13 RX ADMIN — HYDROMORPHONE HYDROCHLORIDE PRN MG: 1 INJECTION, SOLUTION INTRAMUSCULAR; INTRAVENOUS; SUBCUTANEOUS at 13:22

## 2018-08-13 RX ADMIN — HYDROMORPHONE HYDROCHLORIDE PRN MG: 1 INJECTION, SOLUTION INTRAMUSCULAR; INTRAVENOUS; SUBCUTANEOUS at 12:33

## 2018-08-13 RX ADMIN — SODIUM CHLORIDE SCH MLS/HR: 900 IRRIGANT IRRIGATION at 02:25

## 2018-08-13 RX ADMIN — HYDROMORPHONE HYDROCHLORIDE PRN MG: 1 INJECTION, SOLUTION INTRAMUSCULAR; INTRAVENOUS; SUBCUTANEOUS at 13:06

## 2018-08-13 RX ADMIN — MORPHINE SULFATE PRN MG: 2 INJECTION, SOLUTION INTRAMUSCULAR; INTRAVENOUS at 21:28

## 2018-08-13 RX ADMIN — METOCLOPRAMIDE HYDROCHLORIDE SCH MG: 10 TABLET ORAL at 21:26

## 2018-08-13 RX ADMIN — HYDROMORPHONE HYDROCHLORIDE PRN MG: 1 INJECTION, SOLUTION INTRAMUSCULAR; INTRAVENOUS; SUBCUTANEOUS at 12:56

## 2018-08-13 RX ADMIN — PANTOPRAZOLE SODIUM SCH MG: 40 INJECTION, POWDER, FOR SOLUTION INTRAVENOUS at 21:25

## 2018-08-13 RX ADMIN — SODIUM CHLORIDE SCH MLS/HR: 900 IRRIGANT IRRIGATION at 19:39

## 2018-08-13 RX ADMIN — PROCHLORPERAZINE EDISYLATE PRN MEQ: 5 INJECTION INTRAMUSCULAR; INTRAVENOUS at 15:33

## 2018-08-13 NOTE — OP
Amended report to correct date of operation.



CC:  [facility]; Virginia Dobbins [place], [place], phone number [phone]; Dr. Chen*

 

OPERATIVE REPORT:

 

DATE OF OPERATION:  08/13/18 - Inpatient, room [facility]-

 

DATE OF BIRTH:  [birth year]

 

SURGEON:  Edouard Smalls MD

 

ASSISTANT:  Shady Deras MD

 

ANESTHESIOLOGIST:  Virginia Hargrove DO

 

ANESTHESIA:  General anesthesia.

 

PRE-OP DIAGNOSIS:  Symptomatic slipped laparoscopic adjustable gastric banding.

 

POST-OP DIAGNOSIS:  Symptomatic slipped laparoscopic adjustable gastric banding.

 

OPERATIVE PROCEDURE:  Diagnostic laparoscopy and removal of Lap-Band and port.

 

ESTIMATED BLOOD LOSS:  Minimal.

 

FLUIDS:  Minimal crystalloid fluid given.

 

SPECIMENS:  Lap-Band and port.

 

DRAINS:  None.

 

DESCRIPTION OF PROCEDURE:  The patient was identified in the preoperative area.
  I discussed the case with her.  Consent was signed.  I marked her belly.  She 
was brought to the operating room, placed on the operating table in supine 
position. Preoperative antibiotics were given.  Sequential devices were placed 
on the bilateral lower extremities.  General anesthesia was induced.  
Sequential device on the left lower extremity was removed and an IV placed at 
the foot on the left.  The patient's abdomen was prepped and draped in a 
standard surgical fashion and time- out was performed.

 

I dissected on top of the Lap-Band port site.  This was dissected into the 
capsule of the port and the port was cut from its sutures and removed and 
allowed to hang from the abdomen.

 

Next, a Veress needle was inserted through this site and the abdomen was 
allowed to fill to a pressure of 15 mmHg.

 

Next, a 5-mm trocar was inserted in the upper midline.  A laparoscope was 
inserted through this.  There was no evidence of an injury from the trocar 
insertion or from the Veress needle.  Veress needle removed and a 12-mm trocar 
inserted at the right upper quadrant, two 5 mm at the left upper quadrant was 
carried out.

 

Next, review of the abdomen showed scant free fluid.  The small bowel appeared 
intact.  Stomach was distended with air and an OG tube was placed by Anesthesia 
and this easily collapsed.  Gallbladder was distended, but appeared intact.  
Liver was within normal limits.

 

Next, a liver retractor was inserted through a subxiphoid incision.  We placed 
this retractor up towards the scarring of the stomach to the liver edge.

 

Next, with sharp dissection, we were able to free up this portion of the 
stomach from the liver edge and reapproximate the retractor as need be.  
Capsule was cut at the side of the buckle of the Lap-Band.  Additional 
dissection was carried out over the Lap-Band cutting through 3 sutures and 
allowing the fundus of the stomach to unfurl from the top portion of the 
stomach.  We did see a significant amount of stomach above the band.

 

Additional dissection was carried out at the liver edge and retractor shifted 
again.  A small hiatal hernia anteriorly was identified.

 

Next, the buckle of the Lap-Band was taken down and cut off.  The portion of 
the buckle that was cut was removed under direct vision and passed off.  We 
then were able to take the Lap-Band from behind the stomach and pull this 
through at the previous dissection site at the port site.  We cut this through 
almost to the abdominal wall, cut the tubing and passed this off as specimen.

 

Next, the Lap-Band was then brought out through the 12-mm trocar site and 
passed off as specimen.  Liver retractor was placed back to neutral.  
Hemostasis was excellent.  The stomach appeared intact and there was no 
evidence of gastric contents. The table was repositioned back to neutral.  
Abdomen was allowed to collapse. Trocars were removed under direct vision and 
all 5 skin incisions were reapproximated with 4-0 Monocryl subcuticular sutures 
followed by Steri-Strips and sterile dressing.

 

 [id number]/[facility] #: [id number]

DAYSI

## 2018-08-13 NOTE — BRIEFOPN
Brief Operative Note





- Surgery


Procedures: 





Pre-OP Diagnoses:   slipped lapBand





Post-op Diagnosis:   same





Procedure:      Diagnostic laparoscopy, removal of lapband and port





Surgeon:       Slim





Asst:       none





Anethesia:        GETA





EBL:      minimal





IVF:      crystalloid





Specimen:      lap band and port





Drains:      none

## 2018-08-13 NOTE — PN
CC:  [facility].

 

PROGRESS NOTE:

 

DATE OF VISIT:  08/13/18.

 

Ms. Klein is a female admitted to the Hospitalist service, consult to surgery and bariatric surgery fo
r what appears to be a slipped laparoscopic adjustable gastric banding.  Her chart was reviewed as we
ll as the images.  I discussed with her and her  and agreed with the recommendation of removal
 of lap band and port.

 

The patient is approximately 7 years status post lap band placement in [place]. She lost about 166 po
unds, had intermittent issues since that have included anemia and at times vomiting and abdominal binta
n.  She was told by her bariatric doctor that lap band was emptied at last check and that there was n
o reason to remove it all together.  Since presenting to our hospital, additional 1 cc was removed an
d the patient has been followed and also underwent an EGD.  This report was reviewed.

 

PHYSICAL EXAM:  The patient's abdomen is soft, nondistended, nontender. Port is palpable.  There is n
o hernia noted.

 

IMPRESSION:  Symptomatic and slipped laparoscopic adjustable gastric banding. Recommendation is for d
iagnostic laparoscopy and removal of lap band with port.  I outlined the details of the procedure tangi
ng over the risks, benefits and alternatives and the patient agrees.  I discussed the possible compli
cations, which included, but not limited to bleeding, infection, gastric leak, need for additional pr
ocedures, need for prolonged hospitalization and antibiotics, the potential for weight regain and the
 need for additional surgeries regarding the patient's possible hiatal hernia.  I discussed that I wo
uld not fix hiatal hernia at this time.  We would do our best to remove all stitches that may have be
en placed by the original surgeon to keep the band in place.  The patient's questions were answered a
nd we will bring her to the operating room.

 

 [id number]/[facility] #: [id number]

## 2018-08-14 VITALS — SYSTOLIC BLOOD PRESSURE: 99 MMHG | DIASTOLIC BLOOD PRESSURE: 55 MMHG

## 2018-08-14 RX ADMIN — SODIUM CHLORIDE SCH MLS/HR: 900 IRRIGANT IRRIGATION at 11:23

## 2018-08-14 RX ADMIN — SODIUM CHLORIDE SCH MLS/HR: 900 IRRIGANT IRRIGATION at 03:29

## 2018-08-14 RX ADMIN — METOCLOPRAMIDE HYDROCHLORIDE SCH MG: 10 TABLET ORAL at 13:09

## 2018-08-14 RX ADMIN — METOCLOPRAMIDE HYDROCHLORIDE SCH MG: 10 TABLET ORAL at 04:57

## 2018-08-14 RX ADMIN — PANTOPRAZOLE SODIUM SCH MG: 40 INJECTION, POWDER, FOR SOLUTION INTRAVENOUS at 07:33

## 2018-08-14 RX ADMIN — OXYCODONE HYDROCHLORIDE AND ACETAMINOPHEN PRN TAB: 5; 325 TABLET ORAL at 13:14

## 2018-08-14 RX ADMIN — MORPHINE SULFATE PRN MG: 2 INJECTION, SOLUTION INTRAMUSCULAR; INTRAVENOUS at 03:24

## 2018-08-14 RX ADMIN — OXYCODONE HYDROCHLORIDE AND ACETAMINOPHEN PRN TAB: 5; 325 TABLET ORAL at 07:33

## 2018-08-14 NOTE — DS
CC:  [facility]; SARAH [place], phone # [phone] i
n [place]

 

DISCHARGE SUMMARY:

 

DATE OF ADMISSION:  08/11/18

 

DATE OF DISCHARGE:  08/14/18

 

HOSPITAL COURSE:  Ms. Klein is a 38-year-old female who was admitted to the hospital service on 08/11/
18 with diagnoses of nausea, abdominal pain and was worked up, which included an EGD and noted to hav
e a hiatal hernia.  The patient is status post lap band 7 years ago.  The concern was that the band h
ad slipped and this became symptomatic.  The patient had been seen by my partner and case discussed w
[facility] bariatric surgeon, Dr. Garcia as well.  Recommendation after readmission and the workup as descr
ibed above was for removal of the lap band including the port.

 

I met the patient on the day of procedure.  I described to her and her  my recommendations of 
removing the symptomatic lap band that seemed to have slipped. The patient was agreeable and signed c
onsent, was taken to the operating room. Please see operative report for separate details.  Briefly, 
lap band and port were removed.  Diagnostic laparoscopy performed.

 

The patient's postoperative course was uneventful.  Pain is controlled with minimal narcotic.  The pa
gavin states that she does have mild abdominal pain, but no longer has the GI upset that she has been
 dealing with.  I discussed with her my concern of weight regain.  We also discussed the possibility 
of injury to intraabdominal organs ______.  The patient is aware of this.  Her questions were answere
d and the patient is ready for discharge on postoperative day 1.

 

PHYSICAL EXAMINATION:  Physical exam was performed on postoperative day 1.  The patient was afebrile.
  Vital signs were stable.  Blood pressure 99/55 with a heart rate of 60.  Urine output good.  Alert 
and oriented x3.  No apparent distress. Head, Eyes, Ears, Nose, and Throat:  Normocephalic, atraumati
c.  Sclerae anicteric. Mucous membranes are moist.  Lungs:  Clear to auscultation bilaterally.  Abdom
en: Soft, nondistended.  Minimal incisional tenderness.  Dressing is clean, dry, and intact.  Extremi
ties within normal limits with no calf tenderness.

 

IMPRESSION:  Status post lap band and port removal.

 

PLAN:  Plan is for followup at [facility]. The patient has bee
n given directions for removal of dressings tomorrow.  She can shower.  I would like to see her in my
 office, but I do understand as she goes back to [place] that she could follow up with her primary ca
re doctor or bariatric group.  She is aware that weight regain is a concern at this point.  The patie
nt's questions were answered and she is discharged.

 

 [id number]/[facility] #: [id number]